# Patient Record
Sex: MALE | Race: WHITE | NOT HISPANIC OR LATINO
[De-identification: names, ages, dates, MRNs, and addresses within clinical notes are randomized per-mention and may not be internally consistent; named-entity substitution may affect disease eponyms.]

---

## 2020-11-06 PROBLEM — Z00.00 ENCOUNTER FOR PREVENTIVE HEALTH EXAMINATION: Status: ACTIVE | Noted: 2020-11-06

## 2020-11-09 ENCOUNTER — APPOINTMENT (OUTPATIENT)
Dept: UROLOGY | Facility: CLINIC | Age: 50
End: 2020-11-09
Payer: COMMERCIAL

## 2020-11-09 VITALS
RESPIRATION RATE: 16 BRPM | WEIGHT: 220 LBS | SYSTOLIC BLOOD PRESSURE: 122 MMHG | DIASTOLIC BLOOD PRESSURE: 79 MMHG | HEART RATE: 60 BPM | BODY MASS INDEX: 30.8 KG/M2 | HEIGHT: 71 IN | TEMPERATURE: 98.1 F | OXYGEN SATURATION: 96 %

## 2020-11-09 DIAGNOSIS — R35.0 FREQUENCY OF MICTURITION: ICD-10-CM

## 2020-11-09 DIAGNOSIS — Z80.42 FAMILY HISTORY OF MALIGNANT NEOPLASM OF PROSTATE: ICD-10-CM

## 2020-11-09 DIAGNOSIS — N28.1 CYST OF KIDNEY, ACQUIRED: ICD-10-CM

## 2020-11-09 DIAGNOSIS — R35.1 NOCTURIA: ICD-10-CM

## 2020-11-09 DIAGNOSIS — Z80.52 FAMILY HISTORY OF MALIGNANT NEOPLASM OF BLADDER: ICD-10-CM

## 2020-11-09 DIAGNOSIS — Z86.39 PERSONAL HISTORY OF OTHER ENDOCRINE, NUTRITIONAL AND METABOLIC DISEASE: ICD-10-CM

## 2020-11-09 DIAGNOSIS — R39.15 URGENCY OF URINATION: ICD-10-CM

## 2020-11-09 PROCEDURE — 99203 OFFICE O/P NEW LOW 30 MIN: CPT | Mod: 25

## 2020-11-09 PROCEDURE — 51798 US URINE CAPACITY MEASURE: CPT

## 2020-11-09 PROCEDURE — 99072 ADDL SUPL MATRL&STAF TM PHE: CPT

## 2020-11-09 RX ORDER — TAMSULOSIN HYDROCHLORIDE 0.4 MG/1
0.4 CAPSULE ORAL
Qty: 30 | Refills: 6 | Status: ACTIVE | COMMUNITY
Start: 2020-11-09 | End: 1900-01-01
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
## 2020-11-09 NOTE — PHYSICAL EXAM
[General Appearance - Well Developed] : well developed [General Appearance - Well Nourished] : well nourished [Normal Appearance] : normal appearance [Well Groomed] : well groomed [General Appearance - In No Acute Distress] : no acute distress [Edema] : no peripheral edema [Respiration, Rhythm And Depth] : normal respiratory rhythm and effort [Exaggerated Use Of Accessory Muscles For Inspiration] : no accessory muscle use [Abdomen Soft] : soft [Abdomen Tenderness] : non-tender [Abdomen Mass (___ Cm)] : no abdominal mass palpated [Abdomen Hernia] : no hernia was discovered [Costovertebral Angle Tenderness] : no ~M costovertebral angle tenderness [FreeTextEntry1] : pvr-- 64 ml  [Rectal Exam - Rectum] : digital rectal exam was normal [Prostate Tenderness] : the prostate was not tender [No Prostate Nodules] : no prostate nodules [Prostate Size ___ gm] : prostate size [unfilled] gm [Normal Station and Gait] : the gait and station were normal for the patient's age [] : no rash [No Focal Deficits] : no focal deficits [Oriented To Time, Place, And Person] : oriented to person, place, and time [Affect] : the affect was normal [Mood] : the mood was normal [Not Anxious] : not anxious [Cervical Lymph Nodes Enlarged Posterior Bilaterally] : posterior cervical [Cervical Lymph Nodes Enlarged Anterior Bilaterally] : anterior cervical [Supraclavicular Lymph Nodes Enlarged Bilaterally] : supraclavicular

## 2020-11-09 NOTE — ASSESSMENT
[FreeTextEntry1] : paient wth BPH on exam and luts both obstructive and irritative\par PVR - 64 ml \par AUA score of 21\par found to habve renal cyst on MRI \par will get HECTOR to confirm simple cyst and no need for f/u - small at 1.5 cm \par as for LUTS--will start flomax - risks and benefits discussed\par rtc 3-4 weeks to asses luts and need for further testing

## 2020-11-09 NOTE — REVIEW OF SYSTEMS
[Negative] : Heme/Lymph [Abdominal Pain] : abdominal pain [Vomiting] : vomiting [Diarrhea] : diarrhea [Strong urge to urinate] : strong urge to urinate [Bladder pressure] : experiences bladder pressure [Heartburn] : heartburn [see HPI] : see HPI [Strain or push to urinate] : strain or push to urinate [Joint Pain] : joint pain [FreeTextEntry2] : frequent voids [FreeTextEntry3] : ANN cordova

## 2020-11-11 LAB
APPEARANCE: CLEAR
BACTERIA: NEGATIVE
BILIRUBIN URINE: NEGATIVE
BLOOD URINE: NEGATIVE
COLOR: COLORLESS
GLUCOSE QUALITATIVE U: NEGATIVE
HYALINE CASTS: 0 /LPF
KETONES URINE: NEGATIVE
LEUKOCYTE ESTERASE URINE: NEGATIVE
MICROSCOPIC-UA: NORMAL
NITRITE URINE: NEGATIVE
PH URINE: 6
PROTEIN URINE: NEGATIVE
RED BLOOD CELLS URINE: 0 /HPF
SPECIFIC GRAVITY URINE: 1.01
SQUAMOUS EPITHELIAL CELLS: 0 /HPF
UROBILINOGEN URINE: NORMAL
WHITE BLOOD CELLS URINE: 0 /HPF

## 2020-11-19 ENCOUNTER — APPOINTMENT (OUTPATIENT)
Dept: ENDOCRINOLOGY | Facility: CLINIC | Age: 50
End: 2020-11-19

## 2020-12-02 ENCOUNTER — APPOINTMENT (OUTPATIENT)
Dept: UROLOGY | Facility: CLINIC | Age: 50
End: 2020-12-02

## 2024-03-13 ENCOUNTER — APPOINTMENT (OUTPATIENT)
Dept: CT IMAGING | Facility: IMAGING CENTER | Age: 54
End: 2024-03-13
Payer: COMMERCIAL

## 2024-03-13 ENCOUNTER — OUTPATIENT (OUTPATIENT)
Dept: OUTPATIENT SERVICES | Facility: HOSPITAL | Age: 54
LOS: 1 days | End: 2024-03-13
Payer: COMMERCIAL

## 2024-03-13 DIAGNOSIS — Z00.8 ENCOUNTER FOR OTHER GENERAL EXAMINATION: ICD-10-CM

## 2024-03-13 PROCEDURE — 71275 CT ANGIOGRAPHY CHEST: CPT

## 2024-03-13 PROCEDURE — 71275 CT ANGIOGRAPHY CHEST: CPT | Mod: 26

## 2024-03-25 ENCOUNTER — OUTPATIENT (OUTPATIENT)
Dept: OUTPATIENT SERVICES | Facility: HOSPITAL | Age: 54
LOS: 1 days | End: 2024-03-25
Payer: COMMERCIAL

## 2024-03-25 ENCOUNTER — APPOINTMENT (OUTPATIENT)
Dept: CT IMAGING | Facility: IMAGING CENTER | Age: 54
End: 2024-03-25
Payer: COMMERCIAL

## 2024-03-25 DIAGNOSIS — Z00.8 ENCOUNTER FOR OTHER GENERAL EXAMINATION: ICD-10-CM

## 2024-03-25 PROCEDURE — 70496 CT ANGIOGRAPHY HEAD: CPT

## 2024-03-25 PROCEDURE — 70496 CT ANGIOGRAPHY HEAD: CPT | Mod: 26

## 2024-03-26 ENCOUNTER — APPOINTMENT (OUTPATIENT)
Dept: PULMONOLOGY | Facility: CLINIC | Age: 54
End: 2024-03-26
Payer: COMMERCIAL

## 2024-03-26 ENCOUNTER — NON-APPOINTMENT (OUTPATIENT)
Age: 54
End: 2024-03-26

## 2024-03-26 VITALS
BODY MASS INDEX: 29.26 KG/M2 | SYSTOLIC BLOOD PRESSURE: 116 MMHG | HEIGHT: 71 IN | TEMPERATURE: 98.2 F | DIASTOLIC BLOOD PRESSURE: 75 MMHG | HEART RATE: 68 BPM | WEIGHT: 209 LBS | OXYGEN SATURATION: 96 %

## 2024-03-26 DIAGNOSIS — R91.1 SOLITARY PULMONARY NODULE: ICD-10-CM

## 2024-03-26 PROCEDURE — 99205 OFFICE O/P NEW HI 60 MIN: CPT

## 2024-03-26 RX ORDER — APIXABAN 5 MG/1
5 TABLET, FILM COATED ORAL
Qty: 60 | Refills: 3 | Status: ACTIVE | COMMUNITY
Start: 2024-03-26

## 2024-03-26 RX ORDER — METOPROLOL TARTRATE 25 MG/1
25 TABLET, FILM COATED ORAL
Refills: 0 | Status: ACTIVE | COMMUNITY
Start: 2024-03-26

## 2024-03-26 NOTE — PROCEDURE
[FreeTextEntry1] : Right upper lobe 3 mm pulmonary nodule based on CT angiogram Negative pulmonary embolism

## 2024-03-26 NOTE — REVIEW OF SYSTEMS
[Diabetes] : diabetes [Negative] : Endocrine [Thyroid Problem] : no thyroid problem [TextBox_30] : HPI [TextBox_141] : off Metformin [TextBox_44] : A FIB Flutter

## 2024-03-26 NOTE — PHYSICAL EXAM
[No Acute Distress] : no acute distress [Normal Oropharynx] : normal oropharynx [II] : Mallampati Class: II [Supple] : supple [Normal Appearance] : normal appearance [No Neck Mass] : no neck mass [No JVD] : no jvd [Normal Rate/Rhythm] : normal rate/rhythm [Normal S1, S2] : normal s1, s2 [No Murmurs] : no murmurs [No Resp Distress] : no resp distress [No Acc Muscle Use] : no acc muscle use [Normal Palpation] : normal palpation [Normal Rhythm and Effort] : normal rhythm and effort [Clear to Auscultation Bilaterally] : clear to auscultation bilaterally [Normal to Percussion] : normal to percussion [No Abnormalities] : no abnormalities [Benign] : benign [Not Tender] : not tender [Soft] : soft [No HSM] : no hsm [Normal Bowel Sounds] : normal bowel sounds [Normal Gait] : normal gait [No Clubbing] : no clubbing [No Cyanosis] : no cyanosis [No Edema] : no edema [Normal Color/ Pigmentation] : normal color/ pigmentation [No Focal Deficits] : no focal deficits [Normal Affect] : normal affect [Oriented x3] : oriented x3

## 2024-03-26 NOTE — DISCUSSION/SUMMARY
[FreeTextEntry1] : Solitary subcentimeter pulmonary nodule 3 mm Based on Fleischner guidelines recommendation is in low risk or high risk patient no significant need for repeat scanning I did discuss with patient in detail Will repeat CT chest for completeness in 1 year Asked patient to return to the office for a PFT to be complete Cardiology follow-up

## 2024-03-26 NOTE — HISTORY OF PRESENT ILLNESS
[Former] : former [TextBox_29] : teenager [TextBox_4] : Pulmonary evaluation Pulmonary nodule Ongoing management with cardiology atrial fibrillation a flutter Pending cardiac ablation  Medications Eliquis 5 mg twice daily Toprol 25 mg 3 times daily No history of pulmonary disease prior No history of a prior noted pulmonary nodule with imaging No history of asthma COPD emphysema bronchitis interstitial lung disease No wheeze chest pain chest tightness CT chest angiogram protocol March 13, 2024 [facility] Indication shortness of breath Findings Cardiomegaly Coronary artery calcification Lungs Airways pleura no focal consolidation Patent airways Right upper lobe 3 mm pulmonary nodules Mediastinum lymph nodes no thoracic adenopathy Partially imaged left renal hypodensity Overall impression no evidence of pulmonary embolism  Chest x-ray dating back to June 7, 2016 clear lungs

## 2024-03-26 NOTE — CONSULT LETTER
[Dear  ___] : Dear  [unfilled], [Please see my note below.] : Please see my note below. [Consult Letter:] : I had the pleasure of evaluating your patient, [unfilled]. [Consult Closing:] : Thank you very much for allowing me to participate in the care of this patient.  If you have any questions, please do not hesitate to contact me. [Sincerely,] : Sincerely, [FreeTextEntry3] : Samir Dyer D.O., HAI.  of Medicine [facility]

## 2024-05-28 ENCOUNTER — APPOINTMENT (OUTPATIENT)
Dept: PULMONOLOGY | Facility: CLINIC | Age: 54
End: 2024-05-28

## 2024-07-30 ENCOUNTER — APPOINTMENT (OUTPATIENT)
Dept: PULMONOLOGY | Facility: CLINIC | Age: 54
End: 2024-07-30
Payer: COMMERCIAL

## 2024-07-30 VITALS
RESPIRATION RATE: 16 BRPM | HEART RATE: 62 BPM | WEIGHT: 215 LBS | BODY MASS INDEX: 30.1 KG/M2 | TEMPERATURE: 97.6 F | SYSTOLIC BLOOD PRESSURE: 128 MMHG | DIASTOLIC BLOOD PRESSURE: 81 MMHG | HEIGHT: 71 IN | OXYGEN SATURATION: 94 %

## 2024-07-30 DIAGNOSIS — R94.2 ABNORMAL RESULTS OF PULMONARY FUNCTION STUDIES: ICD-10-CM

## 2024-07-30 DIAGNOSIS — G47.30 SLEEP APNEA, UNSPECIFIED: ICD-10-CM

## 2024-07-30 DIAGNOSIS — I48.91 UNSPECIFIED ATRIAL FIBRILLATION: ICD-10-CM

## 2024-07-30 PROCEDURE — 94010 BREATHING CAPACITY TEST: CPT

## 2024-07-30 PROCEDURE — 99214 OFFICE O/P EST MOD 30 MIN: CPT | Mod: 25

## 2024-07-30 NOTE — DISCUSSION/SUMMARY
[FreeTextEntry1] : Nocturnal atrial fibrillation symptomatology and patient with known history postcardiac ablation Rule out obstructive sleep apnea Abnormal PFT Will repeat full PFT at follow-up visit Formal home sleep study Pathophysiology of obstructive sleep apnea discussed Treatment protocols reviewed including CPAP Demonstrated home sleep study Office follow-up  Solitary subcentimeter pulmonary nodule 3 mm Based on Fleischner guidelines recommendation is in low risk or high risk patient no significant need for repeat scanning I did discuss with patient in detail Will repeat CT chest for completeness in 1 year Asked patient to return to the office for a PFT to be complete Cardiology follow-up

## 2024-07-30 NOTE — CONSULT LETTER
[Dear  ___] : Dear  [unfilled], [Consult Letter:] : I had the pleasure of evaluating your patient, [unfilled]. [Please see my note below.] : Please see my note below. [Consult Closing:] : Thank you very much for allowing me to participate in the care of this patient.  If you have any questions, please do not hesitate to contact me. [Sincerely,] : Sincerely, [FreeTextEntry3] : Samir Dyer D.O., HAI.  of Medicine [facility]

## 2024-07-30 NOTE — HISTORY OF PRESENT ILLNESS
[Former] : former [Awakes Unrefreshed] : awakes unrefreshed [Snoring] : snoring [TextBox_4] : Pulmonary evaluation issue r/o MARCUS snoring   A Fib nocturnal  Pulmonary nodule Ongoing management with cardiology atrial fibrillation a flutter Pending cardiac ablation  Medications Eliquis 5 mg twice daily Toprol 25 mg 3 times daily No history of pulmonary disease prior No history of a prior noted pulmonary nodule with imaging No history of asthma COPD emphysema bronchitis interstitial lung disease No wheeze chest pain chest tightness CT chest angiogram protocol March 13, 2024 [facility] Indication shortness of breath Findings Cardiomegaly Coronary artery calcification Lungs Airways pleura no focal consolidation Patent airways Right upper lobe 3 mm pulmonary nodules Mediastinum lymph nodes no thoracic adenopathy Partially imaged left renal hypodensity Overall impression no evidence of pulmonary embolism  Chest x-ray dating back to June 7, 2016 clear lungs  [TextBox_29] : teenager

## 2024-07-30 NOTE — REVIEW OF SYSTEMS
[Diabetes] : diabetes [Negative] : Endocrine [Thyroid Problem] : no thyroid problem [TextBox_30] : HPI [TextBox_44] : A FIB Flutter [TextBox_141] : off Metformin

## 2024-07-30 NOTE — PHYSICAL EXAM
[No Acute Distress] : no acute distress [Normal Oropharynx] : normal oropharynx [II] : Mallampati Class: II [Normal Appearance] : normal appearance [Supple] : supple [No Neck Mass] : no neck mass [No JVD] : no jvd [Normal Rate/Rhythm] : normal rate/rhythm [Normal S1, S2] : normal s1, s2 [No Murmurs] : no murmurs [No Resp Distress] : no resp distress [No Acc Muscle Use] : no acc muscle use [Normal Palpation] : normal palpation [Normal Rhythm and Effort] : normal rhythm and effort [Clear to Auscultation Bilaterally] : clear to auscultation bilaterally [Normal to Percussion] : normal to percussion [No Abnormalities] : no abnormalities [Benign] : benign [Not Tender] : not tender [Soft] : soft [No HSM] : no hsm [Normal Bowel Sounds] : normal bowel sounds [Normal Gait] : normal gait [No Clubbing] : no clubbing [No Cyanosis] : no cyanosis [No Edema] : no edema [Normal Color/ Pigmentation] : normal color/ pigmentation [No Focal Deficits] : no focal deficits [Oriented x3] : oriented x3 [Normal Affect] : normal affect

## 2024-07-30 NOTE — PROCEDURE
[FreeTextEntry1] : Right upper lobe 3 mm pulmonary nodule based on CT angiogram Negative pulmonary embolism  Spirometry no bronchodilator July 30, 2024 Question technically limited Moderate obstructive ventilatory impairment Sawtooth pattern

## 2024-10-01 ENCOUNTER — APPOINTMENT (OUTPATIENT)
Dept: PULMONOLOGY | Facility: CLINIC | Age: 54
End: 2024-10-01
Payer: COMMERCIAL

## 2024-10-01 VITALS
DIASTOLIC BLOOD PRESSURE: 79 MMHG | BODY MASS INDEX: 30.1 KG/M2 | HEART RATE: 52 BPM | OXYGEN SATURATION: 97 % | TEMPERATURE: 98.3 F | WEIGHT: 215 LBS | SYSTOLIC BLOOD PRESSURE: 123 MMHG | HEIGHT: 71 IN

## 2024-10-01 DIAGNOSIS — R05.9 COUGH, UNSPECIFIED: ICD-10-CM

## 2024-10-01 DIAGNOSIS — R91.1 SOLITARY PULMONARY NODULE: ICD-10-CM

## 2024-10-01 DIAGNOSIS — G47.30 SLEEP APNEA, UNSPECIFIED: ICD-10-CM

## 2024-10-01 DIAGNOSIS — R94.2 ABNORMAL RESULTS OF PULMONARY FUNCTION STUDIES: ICD-10-CM

## 2024-10-01 LAB — HEMOGLOBIN: 12.1

## 2024-10-01 PROCEDURE — 99214 OFFICE O/P EST MOD 30 MIN: CPT | Mod: 25

## 2024-10-01 PROCEDURE — 94729 DIFFUSING CAPACITY: CPT

## 2024-10-01 PROCEDURE — 85018 HEMOGLOBIN: CPT | Mod: QW

## 2024-10-01 PROCEDURE — 94727 GAS DIL/WSHOT DETER LNG VOL: CPT

## 2024-10-01 PROCEDURE — 94010 BREATHING CAPACITY TEST: CPT

## 2024-10-01 PROCEDURE — ZZZZZ: CPT

## 2024-10-01 NOTE — HISTORY OF PRESENT ILLNESS
[Former] : former [Awakes Unrefreshed] : awakes unrefreshed [Snoring] : snoring [TextBox_4] : Pulmonary evaluation issue r/o MARCUS snoring   A Fib nocturnal  Pulmonary nodule Ongoing management with cardiology atrial fibrillation a flutter Pending cardiac ablation  Medications Eliquis 5 mg twice daily Toprol 25 mg 3 times daily No history of pulmonary disease prior No history of a prior noted pulmonary nodule with imaging No history of asthma COPD emphysema bronchitis interstitial lung disease No wheeze chest pain chest tightness CT chest angiogram protocol March 13, 2024 [facility] Indication shortness of breath Findings Cardiomegaly Coronary artery calcification Lungs Airways pleura no focal consolidation Patent airways Right upper lobe 3 mm pulmonary nodules Mediastinum lymph nodes no thoracic adenopathy Partially imaged left renal hypodensity Overall impression no evidence of pulmonary embolism  Chest x-ray dating back to June 7, 2016 clear lungs  [TextBox_29] : teenager

## 2024-10-01 NOTE — CONSULT LETTER
[Dear  ___] : Dear  [unfilled], [Consult Letter:] : I had the pleasure of evaluating your patient, [unfilled]. [Please see my note below.] : Please see my note below. [Consult Closing:] : Thank you very much for allowing me to participate in the care of this patient.  If you have any questions, please do not hesitate to contact me. [Sincerely,] : Sincerely, [FreeTextEntry3] : Samir Dyer D.O., HAI.  of Medicine [facility] [place]

## 2024-10-01 NOTE — PROCEDURE
[FreeTextEntry1] : PFT October 1, 2024 Well-preserved flow rates Mild obstructive ventilatory impairment FEV1 FVC ratio 71 Lung volumes demonstrate mild air trapping with increased RV and borderline elevated RV/TLC ratio 126% of predicted upper limits of normal Diffusion normal range 98% predicted Hemoglobin 12.1   Right upper lobe 3 mm pulmonary nodule based on CT angiogram Negative pulmonary embolism  Spirometry no bronchodilator July 30, 2024 Question technically limited Moderate obstructive ventilatory impairment Sawtooth pattern

## 2024-10-01 NOTE — DISCUSSION/SUMMARY
[FreeTextEntry1] : Nocturnal atrial fibrillation symptomatology and patient with known history postcardiac ablation Rule out obstructive sleep apnea Abnormal PFT Will repeat full PFT at follow-up visit noted demonstrates a mild obstructive pattern but interval improvement Formal home sleep study ordered Pathophysiology of obstructive sleep apnea discussed Treatment protocols reviewed including CPAP Demonstrated home sleep study Office follow-up  Solitary subcentimeter pulmonary nodule 3 mm Based on Fleischner guidelines recommendation is in low risk or high risk patient no significant need for repeat scanning I did discuss with patient in detail Will repeat CT chest for completeness in 1 year address March 2025 Asked patient to return to the office for a PFT to be complete done Cardiology follow-up

## 2024-10-02 ENCOUNTER — APPOINTMENT (OUTPATIENT)
Dept: PULMONOLOGY | Facility: CLINIC | Age: 54
End: 2024-10-02
Payer: COMMERCIAL

## 2024-10-02 DIAGNOSIS — G47.33 OBSTRUCTIVE SLEEP APNEA (ADULT) (PEDIATRIC): ICD-10-CM

## 2024-10-03 PROCEDURE — 95800 SLP STDY UNATTENDED: CPT

## 2024-10-06 PROCEDURE — 95800 SLP STDY UNATTENDED: CPT

## 2024-10-07 PROCEDURE — 95800 SLP STDY UNATTENDED: CPT

## 2024-10-11 PROBLEM — G47.33 OBSTRUCTIVE SLEEP APNEA: Status: ACTIVE | Noted: 2024-10-11

## 2024-10-29 ENCOUNTER — APPOINTMENT (OUTPATIENT)
Dept: PULMONOLOGY | Facility: CLINIC | Age: 54
End: 2024-10-29

## 2024-11-05 ENCOUNTER — APPOINTMENT (OUTPATIENT)
Dept: PULMONOLOGY | Facility: CLINIC | Age: 54
End: 2024-11-05
Payer: COMMERCIAL

## 2024-11-05 VITALS
DIASTOLIC BLOOD PRESSURE: 81 MMHG | BODY MASS INDEX: 29.82 KG/M2 | HEART RATE: 51 BPM | TEMPERATURE: 98.2 F | SYSTOLIC BLOOD PRESSURE: 122 MMHG | HEIGHT: 71 IN | OXYGEN SATURATION: 95 % | WEIGHT: 213 LBS

## 2024-11-05 DIAGNOSIS — G47.33 OBSTRUCTIVE SLEEP APNEA (ADULT) (PEDIATRIC): ICD-10-CM

## 2024-11-05 DIAGNOSIS — G47.30 SLEEP APNEA, UNSPECIFIED: ICD-10-CM

## 2024-11-05 DIAGNOSIS — R91.1 SOLITARY PULMONARY NODULE: ICD-10-CM

## 2024-11-05 DIAGNOSIS — R94.2 ABNORMAL RESULTS OF PULMONARY FUNCTION STUDIES: ICD-10-CM

## 2024-11-05 DIAGNOSIS — R40.0 SOMNOLENCE: ICD-10-CM

## 2024-11-05 PROCEDURE — G2211 COMPLEX E/M VISIT ADD ON: CPT | Mod: NC

## 2024-11-05 PROCEDURE — 99214 OFFICE O/P EST MOD 30 MIN: CPT

## 2025-02-13 ENCOUNTER — APPOINTMENT (OUTPATIENT)
Dept: PULMONOLOGY | Facility: CLINIC | Age: 55
End: 2025-02-13
Payer: COMMERCIAL

## 2025-02-13 VITALS
OXYGEN SATURATION: 96 % | HEIGHT: 71 IN | WEIGHT: 215 LBS | HEART RATE: 60 BPM | RESPIRATION RATE: 15 BRPM | SYSTOLIC BLOOD PRESSURE: 133 MMHG | BODY MASS INDEX: 30.1 KG/M2 | DIASTOLIC BLOOD PRESSURE: 64 MMHG

## 2025-02-13 DIAGNOSIS — G47.33 OBSTRUCTIVE SLEEP APNEA (ADULT) (PEDIATRIC): ICD-10-CM

## 2025-02-13 PROCEDURE — 94660 CPAP INITIATION&MGMT: CPT

## 2025-03-13 ENCOUNTER — APPOINTMENT (OUTPATIENT)
Dept: PULMONOLOGY | Facility: CLINIC | Age: 55
End: 2025-03-13